# Patient Record
Sex: MALE | Race: BLACK OR AFRICAN AMERICAN | NOT HISPANIC OR LATINO | URBAN - METROPOLITAN AREA
[De-identification: names, ages, dates, MRNs, and addresses within clinical notes are randomized per-mention and may not be internally consistent; named-entity substitution may affect disease eponyms.]

---

## 2018-10-28 ENCOUNTER — EMERGENCY (EMERGENCY)
Facility: HOSPITAL | Age: 48
LOS: 1 days | Discharge: HOME | End: 2018-10-28
Attending: EMERGENCY MEDICINE
Payer: COMMERCIAL

## 2018-10-28 VITALS
HEART RATE: 93 BPM | RESPIRATION RATE: 16 BRPM | OXYGEN SATURATION: 100 % | DIASTOLIC BLOOD PRESSURE: 64 MMHG | SYSTOLIC BLOOD PRESSURE: 150 MMHG | TEMPERATURE: 99 F

## 2018-10-28 VITALS
TEMPERATURE: 98 F | HEIGHT: 70 IN | RESPIRATION RATE: 17 BRPM | OXYGEN SATURATION: 100 % | HEART RATE: 100 BPM | SYSTOLIC BLOOD PRESSURE: 147 MMHG | WEIGHT: 190.04 LBS | DIASTOLIC BLOOD PRESSURE: 88 MMHG

## 2018-10-28 PROCEDURE — 99285 EMERGENCY DEPT VISIT HI MDM: CPT | Mod: 25

## 2018-10-28 PROCEDURE — 73610 X-RAY EXAM OF ANKLE: CPT | Mod: 26,LT,76

## 2018-10-28 PROCEDURE — 99285 EMERGENCY DEPT VISIT HI MDM: CPT

## 2018-10-28 PROCEDURE — 73610 X-RAY EXAM OF ANKLE: CPT

## 2018-10-28 PROCEDURE — 27810 TREATMENT OF ANKLE FRACTURE: CPT

## 2018-10-28 RX ORDER — OXYCODONE AND ACETAMINOPHEN 5; 325 MG/1; MG/1
1 TABLET ORAL EVERY 4 HOURS
Qty: 0 | Refills: 0 | Status: DISCONTINUED | OUTPATIENT
Start: 2018-10-28 | End: 2018-10-28

## 2018-10-28 RX ORDER — IBUPROFEN 200 MG
1 TABLET ORAL
Qty: 40 | Refills: 0 | OUTPATIENT
Start: 2018-10-28 | End: 2018-11-06

## 2018-10-28 RX ADMIN — OXYCODONE AND ACETAMINOPHEN 1 TABLET(S): 5; 325 TABLET ORAL at 21:10

## 2018-10-28 RX ADMIN — OXYCODONE AND ACETAMINOPHEN 1 TABLET(S): 5; 325 TABLET ORAL at 18:26

## 2018-10-28 NOTE — CONSULT NOTE ADULT - SUBJECTIVE AND OBJECTIVE BOX
HPI: Patient is a 49 y/o M who presents to ED c/o L ankle pain and swelling. Pt reports that he dislocated his L ankle and someone repositioned it for him at work.    PAST MEDICAL & SURGICAL HISTORY:    Review of systems: Non Contributory    MEDICATIONS  (STANDING):    Allergies: No known Allergies    Vital Signs Last 24 Hrs  T(C): 37.1 (28 Oct 2018 21:10), Max: 37.1 (28 Oct 2018 21:10)  T(F): 98.8 (28 Oct 2018 21:10), Max: 98.8 (28 Oct 2018 21:10)  HR: 93 (28 Oct 2018 21:10) (93 - 100)  BP: 150/64 (28 Oct 2018 21:10) (147/88 - 150/64)  BP(mean): --  RR: 16 (28 Oct 2018 21:10) (16 - 17)  SpO2: 100% (28 Oct 2018 21:10) (100% - 100%)    Physical Examination:    Musculoskeletal:         Neurovascularly Intact    RADIOLOGY & ADDITIONAL STUDIES:    ASSESSMENT:    PLAN/RECOMMENDATION:    FOLLOW UP: HPI: Patient is a 49 y/o M who presents to ED c/o L ankle pain and swelling. Pt reports that he was playing soccer today when he twisted and injured his left ankle.     PAST MEDICAL & SURGICAL HISTORY:    Review of systems: Non Contributory    MEDICATIONS  (STANDING):    Allergies: No known Allergies    Vital Signs Last 24 Hrs  T(C): 37.1 (28 Oct 2018 21:10), Max: 37.1 (28 Oct 2018 21:10)  T(F): 98.8 (28 Oct 2018 21:10), Max: 98.8 (28 Oct 2018 21:10)  HR: 93 (28 Oct 2018 21:10) (93 - 100)  BP: 150/64 (28 Oct 2018 21:10) (147/88 - 150/64)  BP(mean): --  RR: 16 (28 Oct 2018 21:10) (16 - 17)  SpO2: 100% (28 Oct 2018 21:10) (100% - 100%)    Physical Examination:    Musculoskeletal:   Physical examination of left ankle shows pain to palpation. There is moderate swelling, ecchymosis and decreased in range of motion noted.      Neurovascularly Intact    RADIOLOGY & ADDITIONAL STUDIES: X-ray of left ankle done in the ER shows a mildly displaced trimalleolar fracture.    ASSESSMENT: Left ankle displacement trimalleolar fracture     PLAN/RECOMMENDATION: ORIF option was recommended. Risk, benefits and alternatives were discussed. Patient is going to consider the ORIF option, meanwhile is going to proceed with the closed treatment of the fracture.     Closed treatment of fracture and application of short leg cast. Patient tolerated well.     Post reduction X-ray showed overall satisfactory alignment of the fracture.     Rest. Apply ice. Elevation. Take anti-inflammatory medication. Apply Voltaren gel.     FOLLOW UP: With office in 2-3 days for reexamination and re-discussion of ORIF option HPI: Patient is a 47 y/o M who presents to ED c/o L ankle pain and swelling. Pt reports that he was playing soccer today when he twisted and injured his left ankle.     PAST MEDICAL & SURGICAL HISTORY:    Review of systems: Non Contributory    MEDICATIONS  (STANDING):    Allergies: No known Allergies    Vital Signs Last 24 Hrs  T(C): 37.1 (28 Oct 2018 21:10), Max: 37.1 (28 Oct 2018 21:10)  T(F): 98.8 (28 Oct 2018 21:10), Max: 98.8 (28 Oct 2018 21:10)  HR: 93 (28 Oct 2018 21:10) (93 - 100)  BP: 150/64 (28 Oct 2018 21:10) (147/88 - 150/64)  BP(mean): --  RR: 16 (28 Oct 2018 21:10) (16 - 17)  SpO2: 100% (28 Oct 2018 21:10) (100% - 100%)    Physical Examination:    Musculoskeletal:   Physical examination of left ankle shows pain to palpation. There is moderate swelling, ecchymosis and decreased in range of motion noted.      Neurovascularly Intact    RADIOLOGY & ADDITIONAL STUDIES: X-ray of left ankle done in the ER shows a mildly displaced trimalleolar fracture with small avulsion fracture of the posterior malleolus.     ASSESSMENT: Left ankle displacement trimalleolar fracture     PLAN/RECOMMENDATION: ORIF option was recommended. Risk, benefits and alternatives were discussed. Patient is going to consider the ORIF option, meanwhile is going to proceed with the closed treatment of the fracture.     Closed treatment of fracture and application of short leg cast. Patient tolerated well.     Post reduction X-ray showed overall satisfactory alignment of the fracture.     Rest. Apply ice. Elevation. Take anti-inflammatory medication. Apply Voltaren gel.     FOLLOW UP: With office in 2-3 days for reexamination and re-discussion of ORIF option

## 2018-10-28 NOTE — ED ADULT NURSE NOTE - OBJECTIVE STATEMENT
States he was playing soccer today fell landed on left ankle , c/o pain ,injury to left ankle.Denies head injury or LOC. States he was playing soccer today fell landed on left ankle , c/o pain ,injury to left ankle.Denies head injury or LOC.Evaluated by  for orthopedic consult. ,left short leg cast applied by  , crutaltagracia ortiz for ambulation with instructions and return demonstration done.

## 2018-10-28 NOTE — ED PROVIDER NOTE - OBJECTIVE STATEMENT
49 y/o M pt presents to ED c/o L ankle pain. Pt reports that he dislocated his L ankle and someone repositioned it for him at work. Pt denies loss of consciousness. Pt denies any other acute complaints at this time.

## 2018-10-28 NOTE — ED ADULT NURSE NOTE - NSIMPLEMENTINTERV_GEN_ALL_ED
Implemented All Fall Risk Interventions:  [place] to call system. Call bell, personal items and telephone within reach. Instruct patient to call for assistance. Room bathroom lighting operational. Non-slip footwear when patient is off stretcher. Physically safe environment: no spills, clutter or unnecessary equipment. Stretcher in lowest position, wheels locked, appropriate side rails in place. Provide visual cue, wrist band, yellow gown, etc. Monitor gait and stability. Monitor for mental status changes and reorient to person, place, and time. Review medications for side effects contributing to fall risk. Reinforce activity limits and safety measures with patient and family.

## 2018-11-22 ENCOUNTER — INPATIENT (INPATIENT)
Facility: HOSPITAL | Age: 48
LOS: 0 days | Discharge: HOME | DRG: 494 | End: 2018-11-22
Attending: INTERNAL MEDICINE | Admitting: INTERNAL MEDICINE
Payer: COMMERCIAL

## 2018-11-22 VITALS
RESPIRATION RATE: 17 BRPM | SYSTOLIC BLOOD PRESSURE: 126 MMHG | TEMPERATURE: 98 F | WEIGHT: 190.04 LBS | OXYGEN SATURATION: 100 % | HEART RATE: 82 BPM | DIASTOLIC BLOOD PRESSURE: 77 MMHG | HEIGHT: 70 IN

## 2018-11-22 VITALS
RESPIRATION RATE: 16 BRPM | OXYGEN SATURATION: 99 % | TEMPERATURE: 99 F | DIASTOLIC BLOOD PRESSURE: 76 MMHG | HEART RATE: 95 BPM | SYSTOLIC BLOOD PRESSURE: 134 MMHG

## 2018-11-22 DIAGNOSIS — Z29.9 ENCOUNTER FOR PROPHYLACTIC MEASURES, UNSPECIFIED: ICD-10-CM

## 2018-11-22 DIAGNOSIS — S82.892A OTHER FRACTURE OF LEFT LOWER LEG, INITIAL ENCOUNTER FOR CLOSED FRACTURE: ICD-10-CM

## 2018-11-22 LAB
ALBUMIN SERPL ELPH-MCNC: 4.3 G/DL — SIGNIFICANT CHANGE UP (ref 3.5–5)
ALP SERPL-CCNC: 78 U/L — SIGNIFICANT CHANGE UP (ref 40–120)
ALT FLD-CCNC: 23 U/L DA — SIGNIFICANT CHANGE UP (ref 10–60)
ANION GAP SERPL CALC-SCNC: 7 MMOL/L — SIGNIFICANT CHANGE UP (ref 5–17)
APTT BLD: 27.7 SEC — SIGNIFICANT CHANGE UP (ref 27.5–36.3)
AST SERPL-CCNC: 18 U/L — SIGNIFICANT CHANGE UP (ref 10–40)
BASOPHILS # BLD AUTO: 0.1 K/UL — SIGNIFICANT CHANGE UP (ref 0–0.2)
BASOPHILS NFR BLD AUTO: 1.5 % — SIGNIFICANT CHANGE UP (ref 0–2)
BILIRUB SERPL-MCNC: 0.6 MG/DL — SIGNIFICANT CHANGE UP (ref 0.2–1.2)
BUN SERPL-MCNC: 15 MG/DL — SIGNIFICANT CHANGE UP (ref 7–18)
CALCIUM SERPL-MCNC: 9 MG/DL — SIGNIFICANT CHANGE UP (ref 8.4–10.5)
CHLORIDE SERPL-SCNC: 103 MMOL/L — SIGNIFICANT CHANGE UP (ref 96–108)
CO2 SERPL-SCNC: 29 MMOL/L — SIGNIFICANT CHANGE UP (ref 22–31)
CREAT SERPL-MCNC: 1.1 MG/DL — SIGNIFICANT CHANGE UP (ref 0.5–1.3)
EOSINOPHIL # BLD AUTO: 0 K/UL — SIGNIFICANT CHANGE UP (ref 0–0.5)
EOSINOPHIL NFR BLD AUTO: 0.6 % — SIGNIFICANT CHANGE UP (ref 0–6)
GLUCOSE SERPL-MCNC: 100 MG/DL — HIGH (ref 70–99)
HCT VFR BLD CALC: 43 % — SIGNIFICANT CHANGE UP (ref 39–50)
HGB BLD-MCNC: 14.3 G/DL — SIGNIFICANT CHANGE UP (ref 13–17)
INR BLD: 1.16 RATIO — SIGNIFICANT CHANGE UP (ref 0.88–1.16)
LYMPHOCYTES # BLD AUTO: 2.1 K/UL — SIGNIFICANT CHANGE UP (ref 1–3.3)
LYMPHOCYTES # BLD AUTO: 43.2 % — SIGNIFICANT CHANGE UP (ref 13–44)
MCHC RBC-ENTMCNC: 30.9 PG — SIGNIFICANT CHANGE UP (ref 27–34)
MCHC RBC-ENTMCNC: 33.2 GM/DL — SIGNIFICANT CHANGE UP (ref 32–36)
MCV RBC AUTO: 93 FL — SIGNIFICANT CHANGE UP (ref 80–100)
MONOCYTES # BLD AUTO: 0.3 K/UL — SIGNIFICANT CHANGE UP (ref 0–0.9)
MONOCYTES NFR BLD AUTO: 7.1 % — SIGNIFICANT CHANGE UP (ref 2–14)
NEUTROPHILS # BLD AUTO: 2.3 K/UL — SIGNIFICANT CHANGE UP (ref 1.8–7.4)
NEUTROPHILS NFR BLD AUTO: 47.6 % — SIGNIFICANT CHANGE UP (ref 43–77)
PLATELET # BLD AUTO: 234 K/UL — SIGNIFICANT CHANGE UP (ref 150–400)
POTASSIUM SERPL-MCNC: 3.8 MMOL/L — SIGNIFICANT CHANGE UP (ref 3.5–5.3)
POTASSIUM SERPL-SCNC: 3.8 MMOL/L — SIGNIFICANT CHANGE UP (ref 3.5–5.3)
PROT SERPL-MCNC: 8.1 G/DL — SIGNIFICANT CHANGE UP (ref 6–8.3)
PROTHROM AB SERPL-ACNC: 12.9 SEC — SIGNIFICANT CHANGE UP (ref 10–12.9)
RBC # BLD: 4.62 M/UL — SIGNIFICANT CHANGE UP (ref 4.2–5.8)
RBC # FLD: 11.9 % — SIGNIFICANT CHANGE UP (ref 10.3–14.5)
SODIUM SERPL-SCNC: 139 MMOL/L — SIGNIFICANT CHANGE UP (ref 135–145)
WBC # BLD: 4.9 K/UL — SIGNIFICANT CHANGE UP (ref 3.8–10.5)
WBC # FLD AUTO: 4.9 K/UL — SIGNIFICANT CHANGE UP (ref 3.8–10.5)

## 2018-11-22 PROCEDURE — 76000 FLUOROSCOPY <1 HR PHYS/QHP: CPT

## 2018-11-22 PROCEDURE — 85027 COMPLETE CBC AUTOMATED: CPT

## 2018-11-22 PROCEDURE — 86901 BLOOD TYPING SEROLOGIC RH(D): CPT

## 2018-11-22 PROCEDURE — 93005 ELECTROCARDIOGRAM TRACING: CPT

## 2018-11-22 PROCEDURE — 76000 FLUOROSCOPY <1 HR PHYS/QHP: CPT | Mod: 26

## 2018-11-22 PROCEDURE — 71046 X-RAY EXAM CHEST 2 VIEWS: CPT

## 2018-11-22 PROCEDURE — 71046 X-RAY EXAM CHEST 2 VIEWS: CPT | Mod: 26

## 2018-11-22 PROCEDURE — 80053 COMPREHEN METABOLIC PANEL: CPT

## 2018-11-22 PROCEDURE — 99285 EMERGENCY DEPT VISIT HI MDM: CPT | Mod: 25

## 2018-11-22 PROCEDURE — C1889: CPT

## 2018-11-22 PROCEDURE — 86850 RBC ANTIBODY SCREEN: CPT

## 2018-11-22 PROCEDURE — 86900 BLOOD TYPING SEROLOGIC ABO: CPT

## 2018-11-22 PROCEDURE — 99285 EMERGENCY DEPT VISIT HI MDM: CPT

## 2018-11-22 PROCEDURE — 27829 TREAT LOWER LEG JOINT: CPT | Mod: AS,LT

## 2018-11-22 PROCEDURE — 85610 PROTHROMBIN TIME: CPT

## 2018-11-22 PROCEDURE — 85730 THROMBOPLASTIN TIME PARTIAL: CPT

## 2018-11-22 RX ORDER — ONDANSETRON 8 MG/1
4 TABLET, FILM COATED ORAL ONCE
Qty: 0 | Refills: 0 | Status: DISCONTINUED | OUTPATIENT
Start: 2018-11-22 | End: 2018-11-22

## 2018-11-22 RX ORDER — SODIUM CHLORIDE 9 MG/ML
1000 INJECTION INTRAMUSCULAR; INTRAVENOUS; SUBCUTANEOUS
Qty: 0 | Refills: 0 | Status: DISCONTINUED | OUTPATIENT
Start: 2018-11-22 | End: 2018-11-22

## 2018-11-22 RX ORDER — ASPIRIN/CALCIUM CARB/MAGNESIUM 324 MG
1 TABLET ORAL
Qty: 14 | Refills: 0 | OUTPATIENT
Start: 2018-11-22 | End: 2018-12-05

## 2018-11-22 RX ORDER — CEPHALEXIN 500 MG
1 CAPSULE ORAL
Qty: 15 | Refills: 0 | OUTPATIENT
Start: 2018-11-22 | End: 2018-11-26

## 2018-11-22 RX ORDER — SODIUM CHLORIDE 9 MG/ML
1000 INJECTION, SOLUTION INTRAVENOUS
Qty: 0 | Refills: 0 | Status: DISCONTINUED | OUTPATIENT
Start: 2018-11-22 | End: 2018-11-22

## 2018-11-22 RX ORDER — HYDROMORPHONE HYDROCHLORIDE 2 MG/ML
0.5 INJECTION INTRAMUSCULAR; INTRAVENOUS; SUBCUTANEOUS
Qty: 0 | Refills: 0 | Status: DISCONTINUED | OUTPATIENT
Start: 2018-11-22 | End: 2018-11-22

## 2018-11-22 RX ADMIN — HYDROMORPHONE HYDROCHLORIDE 0.5 MILLIGRAM(S): 2 INJECTION INTRAMUSCULAR; INTRAVENOUS; SUBCUTANEOUS at 13:25

## 2018-11-22 RX ADMIN — HYDROMORPHONE HYDROCHLORIDE 0.5 MILLIGRAM(S): 2 INJECTION INTRAMUSCULAR; INTRAVENOUS; SUBCUTANEOUS at 13:26

## 2018-11-22 RX ADMIN — HYDROMORPHONE HYDROCHLORIDE 0.5 MILLIGRAM(S): 2 INJECTION INTRAMUSCULAR; INTRAVENOUS; SUBCUTANEOUS at 13:15

## 2018-11-22 RX ADMIN — HYDROMORPHONE HYDROCHLORIDE 0.5 MILLIGRAM(S): 2 INJECTION INTRAMUSCULAR; INTRAVENOUS; SUBCUTANEOUS at 13:39

## 2018-11-22 RX ADMIN — SODIUM CHLORIDE 100 MILLILITER(S): 9 INJECTION INTRAMUSCULAR; INTRAVENOUS; SUBCUTANEOUS at 10:18

## 2018-11-22 NOTE — ED ADULT NURSE NOTE - NSIMPLEMENTINTERV_GEN_ALL_ED
Implemented All Fall Risk Interventions:  [place] to call system. Call bell, personal items and telephone within reach. Instruct patient to call for assistance. Room bathroom lighting operational. Non-slip footwear when patient is off stretcher. Physically safe environment: no spills, clutter or unnecessary equipment. Stretcher in lowest position, wheels locked, appropriate side rails in place. Provide visual cue, wrist band, yellow gown, etc. Monitor gait and stability. Monitor for mental status changes and reorient to person, place, and time. Review medications for side effects contributing to fall risk. Reinforce activity limits and safety measures with patient and family.

## 2018-11-22 NOTE — DISCHARGE NOTE ADULT - CARE PROVIDER_API CALL
Paul Garza), Orthopaedic Surgery  [address] [place] [address]  Phone: [phone]  Fax: [phone]

## 2018-11-22 NOTE — DISCHARGE NOTE ADULT - PLAN OF CARE
Improve pain and ROM - Keep splint clean and dry, do not get it wet.  - Non-weight bearing to left lower extremity with crutches  - Begin taking aspirin 325mg once a day for 14 days  - Begin take keflex 500mg three times a day for 5 days  -  Follow-up with Dr. Garza on Monday 11/26/18 in office at [phone]

## 2018-11-22 NOTE — DISCHARGE NOTE ADULT - MEDICATION SUMMARY - MEDICATIONS TO TAKE
I will START or STAY ON the medications listed below when I get home from the hospital:     mg oral tablet  -- 1 tab(s) by mouth every 6 hours   -- Do not take this drug if you are pregnant.  It is very important that you take or use this exactly as directed.  Do not skip doses or discontinue unless directed by your doctor.  May cause drowsiness or dizziness.  Obtain medical advice before taking any non-prescription drugs as some may affect the action of this medication.  Take with food or milk.    -- Indication: For Pain    aspirin 325 mg oral tablet  -- 1 tab(s) by mouth once a day   -- Take with food or milk.    -- Indication: For DVT prophylaxis    Percocet 5/325 oral tablet  -- 1-2  tab(s) by mouth every 6 hours, As Needed- for moderate pain MDD:7  -- Caution federal law prohibits the transfer of this drug to any person other  than the person for whom it was prescribed.  May cause drowsiness.  Alcohol may intensify this effect.  Use care when operating dangerous machinery.  This prescription cannot be refilled.  This product contains acetaminophen.  Do not use  with any other product containing acetaminophen to prevent possible liver damage.  Using more of this medication than prescribed may cause serious breathing problems.    -- Indication: For Pain    Keflex 500 mg oral capsule  -- 1 cap(s) by mouth 3 times a day MDD:3  -- Finish all this medication unless otherwise directed by prescriber.    -- Indication: For Antibiotic

## 2018-11-22 NOTE — CONSULT NOTE ADULT - SUBJECTIVE AND OBJECTIVE BOX
Patient is a 48y old  Male who presents with a chief complaint of sent for left ankle ORIF.     INTERVAL HPI/OVERNIGHT EVENTS: no new complaints    HPI: 47 Y/O M, from home, no PMhx or PSHs, not on any meds, had displaced left Trimalleolar fracture 3 weeks ago while playing soccer, reduced in field, came to ED after it, seen by DR Ortiz. It was successfully reduced and patient was dc from ED with outpatient f/up in 3 weeks. He followed with Dr Ortiz last week and ankle was healing perfectly. This Monday he went for f/up seems like it was not healing and as per patient ligament was not aligned so he was instructed to come to ED for surgical repair. Patient denies any chest pain, SOB, headache, no leg pain, urinary or bowel complains, only taking prn ibuprofen for now     Shx: Non smoker, occasional alcohol intake, no substance abuse   Meds: Ibuprofen  Pshx/Pmhx: None   Fhx: Not significant    In the ED initial vitals stable, labs will be sent shortly, medicine consult was requested for optimization for left Ankle ORIF.     MEDICATIONS  (STANDING):  sodium chloride 0.9%. 1000 milliLiter(s) (100 mL/Hr) IV Continuous <Continuous    Allergies: No Known Allergies    REVIEW OF SYSTEMS:    CONSTITUTIONAL: No fever,   EYES: no acute visual disturbances  NECK: No pain or stiffness  RESPIRATORY: No cough; No shortness of breath  CARDIOVASCULAR: No chest pain, no palpitations  GASTROINTESTINAL: No pain. No nausea or vomiting; No diarrhea   NEUROLOGICAL: No headache or numbness, no tremors      Vital Signs Last 24 Hrs  T(C): 36.8 (22 Nov 2018 08:19), Max: 36.8 (22 Nov 2018 08:19)  T(F): 98.2 (22 Nov 2018 08:19), Max: 98.2 (22 Nov 2018 08:19)  HR: 82 (22 Nov 2018 08:19) (82 - 82)  BP: 126/77 (22 Nov 2018 08:19) (126/77 - 126/77)  BP(mean): --  RR: 17 (22 Nov 2018 08:19) (17 - 17)  SpO2: 100% (22 Nov 2018 08:19) (100% - 100%)    ________________________________________________  PHYSICAL EXAM:  GENERAL: NAD,   HEAD:  , Normocephalic  EYES:  conjunctiva and sclera clear  NECK: Supple, No JVD    NERVOUS SYSTEM:  Alert & Oriented X3,   CHEST/LUNG: Clear to auscultation bilaterally;   HEART: S1 S2+;   ABDOMEN: Soft, Nontender, Nondistended; Bowel sounds present  EXTREMITIES: no cyanosis; no edema; no calf tenderness                     Left leg covered in cast     _________________________________________________  LABS:              CAPILLARY BLOOD GLUCOSE

## 2018-11-22 NOTE — ED PROVIDER NOTE - NS_ATTENDINGSCRIBE_ED_ALL_ED
99.3
I personally performed the service described in the documentation recorded by the scribe in my presence, and it accurately and completely records my words and actions.

## 2018-11-22 NOTE — ED PROVIDER NOTE - OBJECTIVE STATEMENT
47 y/o M pt with no significant PMHx and no significant PSHx presents to the ED c/o L ankle fracture x3 weeks. Pt had been refusing surgery since, but came in today for surgery. Pt denies numbness, weakness, tingling or any other complaints. NKDA.

## 2018-11-22 NOTE — DISCHARGE NOTE ADULT - FUNCTIONAL SCREEN CURRENT LEVEL: AMBULATION, MLM
1 = assistive equipment/NWB to LLE with Crutches NWB to LLE with Crutches/3 = assistive equipment and person

## 2018-11-22 NOTE — H&P ADULT - NEUROLOGICAL DETAILS
responds to verbal commands/cranial nerves intact/sensation intact/alert and oriented x 3/responds to pain

## 2018-11-22 NOTE — H&P ADULT - NSHPPHYSICALEXAM_GEN_ALL_CORE
Vital Signs Last 24 Hrs  T(C): 36.8 (22 Nov 2018 08:19), Max: 36.8 (22 Nov 2018 08:19)  T(F): 98.2 (22 Nov 2018 08:19), Max: 98.2 (22 Nov 2018 08:19)  HR: 82 (22 Nov 2018 08:19) (82 - 82)  BP: 126/77 (22 Nov 2018 08:19) (126/77 - 126/77)  RR: 17 (22 Nov 2018 08:19) (17 - 17)  SpO2: 100% (22 Nov 2018 08:19) (100% - 100%)

## 2018-11-22 NOTE — H&P ADULT - HISTORY OF PRESENT ILLNESS
Pt is a 49 Y/O M, from home, no PMhx or PSHs, not on any meds, had displaced left Trimalleolar fracture 3 weeks ago while playing soccer, reduced in field, came to ED after it, seen by DR Ortiz. It was successfully reduced and patient was dc from ED with outpatient f/up in 3 weeks. He followed with Dr Ortiz last week and ankle was healing perfectly. This Monday he went for f/up seems like it was not healing, and as per patient, "ligament was not aligned" so he was instructed to come to ED for surgical repair. Patient denies any chest pain, SOB, headache, no leg pain, urinary or bowel complains, only taking prn ibuprofen for now     In the ED initial vitals stable, labs will be sent shortly, medicine consult was requested for optimization for left Ankle ORIF.

## 2018-11-22 NOTE — H&P ADULT - PROBLEM SELECTOR PLAN 1
-Planned for left ankle ORIF by Dr Ortiz   -No PMhx, not on any routine meds, no family hx of heart diseases, non smoker  -ECG NSR at 72 bpm  -Need pre op labs for baseline  -RCRI is 0 points, class 1 risk with 0.4% risk of major cardiac event, METS >4  -Patient is currently at low risk for left ankle ORIF procedure (discussed with attending)  Rest plan as per Ortho team.

## 2018-11-22 NOTE — CONSULT NOTE ADULT - SUBJECTIVE AND OBJECTIVE BOX
Pt Name: ADOLPH SHEPHERD  MRN: [id number]    ORTHOPEDIC CONSULT:    Orthopedic diagnosis:    48yMaleHPI:      47 y/o M with no significant PMHx presents with left ankle fracture x 3 weeks. Patient states he was playing soccer when the injury occurred. Patient was seen in the ED 3 weeks ago, fracture was reduced and splinted and he followed up with Dr. Garza in office. Patient was seen in office for f/u films on Monday and was recommend surgical intervention for the fracture. Patient denies any chest pain, SOB, headache, no leg pain, urinary or bowel complains, only taking prn ibuprofen for now     AMBULATION: Baseline Ambulation [ X ] independent     PAST MEDICAL & SURGICAL HISTORY:      ALLERGIES: No Known Allergies      MEDICATIONS: sodium chloride 0.9%. 1000 milliLiter(s) IV Continuous <Continuous>      PHYSICAL EXAM:    Vital Signs Last 24 Hrs  T(C): 36.8 (22 Nov 2018 08:19), Max: 36.8 (22 Nov 2018 08:19)  T(F): 98.2 (22 Nov 2018 08:19), Max: 98.2 (22 Nov 2018 08:19)  HR: 82 (22 Nov 2018 08:19) (82 - 82)  BP: 126/77 (22 Nov 2018 08:19) (126/77 - 126/77)  BP(mean): --  RR: 17 (22 Nov 2018 08:19) (17 - 17)  SpO2: 100% (22 Nov 2018 08:19) (100% - 100%)    Gen: well developed, well nourished, comfortable  Musculoskeletal:    LLE: Skin warm and pink. Hard cast intact. Full AROM of all digits. NVI. SILT. Cap refill < 2 sec      LABS:                        14.3   4.9   )-----------( 234      ( 22 Nov 2018 09:37 )             43.0           PT/INR - ( 22 Nov 2018 09:37 )   PT: 12.9 sec;   INR: 1.16 ratio         PTT - ( 22 Nov 2018 09:37 )  PTT:27.7 sec      IMPRESSION: Pt is a  48y Male with Left ankle fracture    PLAN:  -  Recommendation:t   [ X  ] Surgical treatment/fixation  -  NPO for surgery today  -  Surgeon: Dr. Garza  -  Procedure: ORIF of left ankle fracture  -  NWB to LLE with crutches  -  Medical optimization  -  Consent to be obtained by Dr. Garza  -  Case d/w Dr. Garza ORTHOPEDIC CONSULT:     HPI: Patient is a 49 y/o M who presents with complaint of left ankle pain. Patient suffered an trimalleolar ankle fracture about three weeks ago as a result of injury in a soccer game. Patient was noted to have syndesmotic widening of left ankle joint on followup X rays.      PAST MEDICAL & SURGICAL HISTORY: None     ALLERGIES: No Known Allergies    MEDICATIONS: Analgesics.     PHYSICAL EXAM:    Vital Signs Last 24 Hrs  T(C): 36.8 (22 Nov 2018 08:19), Max: 36.8 (22 Nov 2018 08:19)  T(F): 98.2 (22 Nov 2018 08:19), Max: 98.2 (22 Nov 2018 08:19)  HR: 82 (22 Nov 2018 08:19) (82 - 82)  BP: 126/77 (22 Nov 2018 08:19) (126/77 - 126/77)  BP(mean): --  RR: 17 (22 Nov 2018 08:19) (17 - 17)  SpO2: 100% (22 Nov 2018 08:19) (100% - 100%)    Gen: well developed, well nourished, comfortable  Musculoskeletal:    LLE: Skin warm and pink. Hard cast intact. Full AROM of all digits. NVI. SILT. Cap refill < 2 sec    LABS:                      14.3   4.9   )-----------( 234      ( 22 Nov 2018 09:37 )             43.0   PT/INR - ( 22 Nov 2018 09:37 )   PT: 12.9 sec;   INR: 1.16 ratio       PTT - ( 22 Nov 2018 09:37 )  PTT:27.7 sec    X ray: Left ankle: Overall acceptable alignment of fracture with Syndesmotic widening.     IMPRESSION: Pt is a  48y Male with Left ankle fracture with syndesmotic widening.     PLAN/ Recommendation: Surgical fixation of syndesmosis and placement of syndesmotic anchors.  Risks, benefits and alternatives were discussed and explained.      Follow up with office after surgery.

## 2018-11-22 NOTE — DISCHARGE NOTE ADULT - PATIENT PORTAL LINK FT
You can access the [facility] Portal, offered by [facility], by registering with the following website: [email or web address]

## 2018-11-22 NOTE — CONSULT NOTE ADULT - ASSESSMENT
47 Y/O M, from home, no PMhx or PSHs, not on any meds, had displaced left Trimalleolar fracture 3 weeks ago while playing soccer, reduced in field initially, came for surgical repair now, medicine consult was requested for optimization for left Ankle ORIF.

## 2018-11-22 NOTE — DISCHARGE NOTE ADULT - CARE PLAN
Principal Discharge DX:	Ankle fracture, left  Goal:	Improve pain and ROM  Assessment and plan of treatment:	- Keep splint clean and dry, do not get it wet.  - Non-weight bearing to left lower extremity with crutches  - Begin taking aspirin 325mg once a day for 14 days  - Begin take keflex 500mg three times a day for 5 days  -  Follow-up with Dr. Garza on Monday 11/26/18 in office at [phone]

## 2018-11-22 NOTE — CONSULT NOTE ADULT - PROBLEM SELECTOR RECOMMENDATION 9
Planned for left ankle ORIF  No PMhx, not on any routine meds, no family hx of heart diseases, non smoker  Needs ECG and pre op labs for f/up   RCRI is 0 points, class 1 risk, METS >4  Patient is currently at -------- risk for left ankle ORIF (discussed with attending)  Rest plan as per Ortho team   RCRI is 0, class 1 risk, Planned for left ankle ORIF by Dr Ortiz   No PMhx, not on any routine meds, no family hx of heart diseases, non smoker  ECG NSR at 72 bpm  Need pre op labs for baseline  RCRI is 0 points, class 1 risk with 0.4% risk of major cardiac event, METS >4  Patient is currently at low risk for left ankle ORIF procedure (discussed with attending)  Rest plan as per Ortho team

## 2019-03-06 NOTE — PATIENT PROFILE ADULT - FALLEN IN THE PAST
He would like to go to the [facility] facility, please fax order there, thank you  
LMOM advising pt that Dr. Lagunas would like him to proceed with the Open MRI.  Advised to call back for number to call and schedule at [facility] [place] at [phone] or [facility] from [facility] at [phone].      PSR:  Please ask which location he prefers and give him the phone number above, then put in the message so I can fax the order.  Thank you.       
LMOM advising pt that we would get the order signed and faxed tomorrow when we are in clinic with Dr. Lagunas.   
Order faxed.   
Please return call to patient in regards to his MRI-it was scheduled for today but his body was not able to fit into the machine. He is asking for alternative sites or alternative treatment.  
Spoke with pt and he advised that his shoulders were too wide for the wide bore MRI at [place].  Do we want to send him for an Open MRI ([place] or [place]) or can we try a CT instead?  He lives in [place] ([facility]).    Please advise.   
no

## 2019-07-01 NOTE — PRE-OP CHECKLIST - TO WHOM
KEN fitzpatrick With associated epigastric pain. Unclear etiology at this point in time. No clear source of infection. Symptoms have resolved. ROS currently negative. CT a/p with IV contrast, TVUS, RUQ sonogram have no elucidated a source of infection. Endoscopy within 1 week per patient "normal," however PUD and H. Pylori a possibility. Lipase within normal limits. Patient has not been smoking marijuana. She does have a history of migraines which holds a associated with cyclic vomiting syndrome. Given lack of risk factors, doubt CV cause of epigastric pain.   -Symptomatic management.  -Will monitor off of abx at this point in time. F/u Bcx and Ucx.  -Resuming pantoprazole.   -H pylori stool ag testing. With associated epigastric pain. Unclear etiology at this point in time. No clear source of infection. Symptoms have resolved. ROS currently negative. CT a/p with IV contrast, TVUS, RUQ sonogram have no elucidated a source of infection. Endoscopy within 1 week per patient "normal," however PUD and H. Pylori a possibility. Lipase within normal limits. Patient has not been smoking marijuana. She does have a history of migraines which holds a associated with cyclic vomiting syndrome. Given lack of risk factors, doubt cardiovascular cause of epigastric pain.   -Symptomatic management.  -F/u Bcx and Ucx.  -Resuming pantoprazole.   -H pylori stool ag testing.  -Consider GI consult. With associated epigastric pain. Unclear etiology at this point in time. No clear source of infection. Symptoms have resolved. ROS currently negative. CT a/p with IV contrast, TVUS, RUQ sonogram have no elucidated a source of infection. Endoscopy within 1 week per patient "normal," however PUD and H. Pylori a possibility. Lipase within normal limits. Patient has not been smoking marijuana. She does have a history of migraines which holds a associated with cyclic vomiting syndrome. Also considering hyperthyroidism and gastroparesis. CNS associated disease unlikely given lack of other signs or symptoms. Given lack of risk factors, doubt cardiovascular cause of epigastric pain.   -Symptomatic management.  -F/u Bcx and Ucx.  -Resuming pantoprazole.   -H pylori stool ag testing  -TSH  -Hgba1c  -Consider GI consult. With associated epigastric pain. Unclear etiology at this point in time. No clear source of infection. Symptoms have resolved. ROS currently negative. CT a/p with IV contrast, TVUS, RUQ sonogram have no elucidated a source of infection. Endoscopy within 1 week per patient "normal," however PUD and H. Pylori a possibility. Lipase within normal limits. Patient has not been smoking marijuana. She does have a history of migraines which holds a associated with cyclic vomiting syndrome. Also considering hyperthyroidism and gastroparesis. CNS associated disease unlikely given lack of other signs or symptoms. Given lack of risk factors, doubt cardiovascular cause of epigastric pain.   -Symptomatic management.  -F/u Bcx and Ucx.  -Resuming pantoprazole.   -H pylori stool ag testing  -TSH, Hgba1c, urine tox.   -GI consult as etiology unclear.

## 2024-03-06 NOTE — PATIENT PROFILE ADULT - FUNCTIONAL SCREEN CURRENT LEVEL: SWALLOWING (IF SCORE 2 OR MORE FOR ANY ITEM, CONSULT REHAB SERVICES), MLM)
Patient's first and last name, , procedure, and correct site confirmed prior to the start of procedure. 0 = swallows foods/liquids without difficulty